# Patient Record
Sex: FEMALE | Race: WHITE | NOT HISPANIC OR LATINO
[De-identification: names, ages, dates, MRNs, and addresses within clinical notes are randomized per-mention and may not be internally consistent; named-entity substitution may affect disease eponyms.]

---

## 2019-03-25 PROBLEM — Z00.00 ENCOUNTER FOR PREVENTIVE HEALTH EXAMINATION: Status: ACTIVE | Noted: 2019-03-25

## 2019-04-03 PROBLEM — R94.39 ABNORMAL STRESS TEST: Status: RESOLVED | Noted: 2019-04-03 | Resolved: 2019-04-03

## 2019-04-03 PROBLEM — I25.2 OLD INFERIOR WALL MYOCARDIAL INFARCTION: Status: RESOLVED | Noted: 2019-04-03 | Resolved: 2019-04-03

## 2019-04-03 PROBLEM — Z87.891 FORMER SMOKER: Status: ACTIVE | Noted: 2019-04-03

## 2019-04-03 PROBLEM — I50.32 CHRONIC DIASTOLIC CONGESTIVE HEART FAILURE: Status: ACTIVE | Noted: 2019-04-03

## 2019-04-03 RX ORDER — NICOTINE 21 MG/24HR
14 PATCH, TRANSDERMAL 24 HOURS TRANSDERMAL DAILY
Qty: 28 | Refills: 0 | Status: ACTIVE | COMMUNITY

## 2019-04-03 RX ORDER — METOPROLOL TARTRATE 25 MG/1
25 TABLET, FILM COATED ORAL TWICE DAILY
Qty: 60 | Refills: 3 | Status: ACTIVE | COMMUNITY

## 2019-04-03 RX ORDER — ATORVASTATIN CALCIUM 80 MG/1
80 TABLET, FILM COATED ORAL
Qty: 30 | Refills: 0 | Status: ACTIVE | COMMUNITY

## 2019-04-03 RX ORDER — CLOPIDOGREL BISULFATE 75 MG/1
75 TABLET, FILM COATED ORAL
Qty: 30 | Refills: 3 | Status: ACTIVE | COMMUNITY

## 2019-04-04 ENCOUNTER — APPOINTMENT (OUTPATIENT)
Dept: CARDIOTHORACIC SURGERY | Facility: CLINIC | Age: 69
End: 2019-04-04
Payer: MEDICARE

## 2019-04-04 DIAGNOSIS — R94.39 ABNORMAL RESULT OF OTHER CARDIOVASCULAR FUNCTION STUDY: ICD-10-CM

## 2019-04-04 DIAGNOSIS — I50.32 CHRONIC DIASTOLIC (CONGESTIVE) HEART FAILURE: ICD-10-CM

## 2019-04-04 DIAGNOSIS — Z87.891 PERSONAL HISTORY OF NICOTINE DEPENDENCE: ICD-10-CM

## 2019-04-04 DIAGNOSIS — I25.2 OLD MYOCARDIAL INFARCTION: ICD-10-CM

## 2019-04-09 ENCOUNTER — APPOINTMENT (OUTPATIENT)
Dept: CARDIOTHORACIC SURGERY | Facility: CLINIC | Age: 69
End: 2019-04-09
Payer: MEDICARE

## 2019-04-09 VITALS — DIASTOLIC BLOOD PRESSURE: 67 MMHG | SYSTOLIC BLOOD PRESSURE: 121 MMHG

## 2019-04-09 VITALS
WEIGHT: 147 LBS | SYSTOLIC BLOOD PRESSURE: 110 MMHG | OXYGEN SATURATION: 98 % | DIASTOLIC BLOOD PRESSURE: 67 MMHG | RESPIRATION RATE: 12 BRPM | HEIGHT: 63 IN | TEMPERATURE: 98.4 F | HEART RATE: 70 BPM | BODY MASS INDEX: 26.05 KG/M2

## 2019-04-09 DIAGNOSIS — I25.10 ATHEROSCLEROTIC HEART DISEASE OF NATIVE CORONARY ARTERY W/OUT ANGINA PECTORIS: ICD-10-CM

## 2019-04-09 DIAGNOSIS — E78.5 HYPERLIPIDEMIA, UNSPECIFIED: ICD-10-CM

## 2019-04-09 PROCEDURE — 99204 OFFICE O/P NEW MOD 45 MIN: CPT

## 2019-04-09 NOTE — DATA REVIEWED
[FreeTextEntry1] : Cardiac Catherization 2/2019 which demonstrated dLM 60%, pLAD 80%, pCx 75%, RCA patent previous stent. Successful EDIS placed to pCx \par \par Echocardiogram in 7/2018 demonstrated\par Left ventricle is normal size wall thickness and systolic function \par left ventricle systolic function, EF 55%\par left ventricular diastolic function (stage 1 diastolic dysfunction)\par 1+ tricuspid regurgitation

## 2019-04-09 NOTE — CONSULT LETTER
[FreeTextEntry2] : Ron Lee M.D.\par [address] [place] [address] [address]\[place]  [id number] [FreeTextEntry3] : Jacob Dawkins MD\par  & \par \par Cardiovascular & Thoracic Surgery\par [facility] \par [address]\par [facility] [id number]\par

## 2019-04-09 NOTE — REVIEW OF SYSTEMS
[Chest Pain] : chest pain [Feeling Tired] : feeling tired [Negative] : Endocrine [Palpitations] : no palpitations [Dizziness] : no dizziness [Fainting] : no fainting

## 2019-04-09 NOTE — ASSESSMENT
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 She underwent a cardiac Catherization (2/6/2019 at [facility]) which demonstrated dLM 60%, pLAD 80%, pCx 75% (s/p EDIS in 2019), RCA patent previous stent. She was referred for surgical consultation. \par \par Imaging results and medical records were reviewed with patient and family member. Significant coronary artery disease, LAD disease. Risks, benefits and alternatives were discussed with patient. All questions were addressed. Since there has been a two month lapse from when the last pictures were taken. On review of the cardiac catherization there may be slight irregularities of recently stented Cx, I would recommend to repeat the cardiac Catherization. FFR to be performed. The MIDCAB procedure was discussed as well. There are more drawbacks of the procedure and the question is this the procedure of choice for this particular patient? Based on cardiac catherization there may be two vessels that will need to bypassed (LAD and possible Cx). \par \par Plan: \par 1) Cardiac Catherization (at [place])\par 2) Robotic MidCAB I would refer patient to my partner Dr. Garber

## 2019-04-09 NOTE — PHYSICAL EXAM
[General Appearance - Alert] : alert [General Appearance - In No Acute Distress] : in no acute distress [PERRL With Normal Accommodation] : pupils were equal in size, round, and reactive to light [Sclera] : the sclera and conjunctiva were normal [Extraocular Movements] : extraocular movements were intact [Outer Ear] : the ears and nose were normal in appearance [Neck Appearance] : the appearance of the neck was normal [Neck Cervical Mass (___cm)] : no neck mass was observed [Oropharynx] : the oropharynx was normal [Jugular Venous Distention Increased] : there was no jugular-venous distention [Thyroid Nodule] : there were no palpable thyroid nodules [Thyroid Diffuse Enlargement] : the thyroid was not enlarged [Respiration, Rhythm And Depth] : normal respiratory rhythm and effort [Apical Impulse] : the apical impulse was normal [Auscultation Breath Sounds / Voice Sounds] : lungs were clear to auscultation bilaterally [Heart Rate And Rhythm] : heart rate was normal and rhythm regular [Examination Of The Chest] : the chest was normal in appearance [Edema] : there was no peripheral edema [Breast Appearance] : normal in appearance [Veins - Varicosity Changes] : there were no varicosital changes [Bowel Sounds] : normal bowel sounds [Abdomen Soft] : soft [Cervical Lymph Nodes Enlarged Anterior Bilaterally] : anterior cervical [Cervical Lymph Nodes Enlarged Posterior Bilaterally] : posterior cervical [Skin Color & Pigmentation] : normal skin color and pigmentation [No CVA Tenderness] : no ~M costovertebral angle tenderness [Abnormal Walk] : normal gait [Oriented To Time, Place, And Person] : oriented to person, place, and time [No Focal Deficits] : no focal deficits [Impaired Insight] : insight and judgment were intact [FreeTextEntry1] : deferred

## 2019-04-09 NOTE — HISTORY OF PRESENT ILLNESS
[FreeTextEntry1] : Anna is a 68 year old female with PMH of HLD, former smoker (recently quit), inferior wall MI (s/p PCI to RCA in 2018) and triple vessel coronary artery disease. She underwent a cardiac Catherization (2/6/2019 at [facility]) which demonstrated dLM 60%, pLAD 80%, pCx 75% (s/p EDIS in 2019), RCA patent previous stent. She was referred for surgical consultation.  She denies chest pain, palpitations, dyspnea on exertion, orthopnea, PND, dizziness or syncope. She denies decline in her functional stamina, currently working a full time job in an assistive living.

## 2023-12-11 ENCOUNTER — APPOINTMENT (OUTPATIENT)
Dept: NEUROSURGERY | Facility: CLINIC | Age: 73
End: 2023-12-11
Payer: MEDICARE

## 2023-12-11 VITALS
SYSTOLIC BLOOD PRESSURE: 145 MMHG | BODY MASS INDEX: 24.8 KG/M2 | DIASTOLIC BLOOD PRESSURE: 85 MMHG | HEIGHT: 63 IN | WEIGHT: 140 LBS | HEART RATE: 71 BPM

## 2023-12-11 PROCEDURE — 99204 OFFICE O/P NEW MOD 45 MIN: CPT

## 2024-01-03 ENCOUNTER — APPOINTMENT (OUTPATIENT)
Dept: NEUROSURGERY | Facility: CLINIC | Age: 74
End: 2024-01-03
Payer: MEDICARE

## 2024-01-03 DIAGNOSIS — M43.10 SPONDYLOLISTHESIS, SITE UNSPECIFIED: ICD-10-CM

## 2024-01-03 DIAGNOSIS — M48.062 SPINAL STENOSIS, LUMBAR REGION WITH NEUROGENIC CLAUDICATION: ICD-10-CM

## 2024-01-03 PROCEDURE — 99441: CPT | Mod: 93

## 2024-02-22 ENCOUNTER — OFFICE (OUTPATIENT)
Dept: URBAN - METROPOLITAN AREA CLINIC 38 | Facility: CLINIC | Age: 74
Setting detail: OPHTHALMOLOGY
End: 2024-02-22
Payer: MEDICARE

## 2024-02-22 DIAGNOSIS — H02.831: ICD-10-CM

## 2024-02-22 DIAGNOSIS — H02.834: ICD-10-CM

## 2024-02-22 DIAGNOSIS — H25.13: ICD-10-CM

## 2024-02-22 PROCEDURE — 92004 COMPRE OPH EXAM NEW PT 1/>: CPT | Performed by: OPHTHALMOLOGY

## 2024-02-22 ASSESSMENT — REFRACTION_CURRENTRX
OS_SPHERE: +0.75
OS_OVR_VA: 20/
OD_OVR_VA: 20/
OS_CYLINDER: -2.50
OD_OVR_VA: 20/
OS_VPRISM_DIRECTION: SV
OS_SPHERE: +3.75
OD_CYLINDER: -3.00
OD_AXIS: 092
OD_VPRISM_DIRECTION: SV
OS_CYLINDER: -2.50
OS_VPRISM_DIRECTION: SV
OD_VPRISM_DIRECTION: SV
OD_SPHERE: +2.25
OD_CYLINDER: -3.25
OD_AXIS: 101
OS_AXIS: 085
OS_AXIS: 091
OD_SPHERE: -1.00
OS_OVR_VA: 20/

## 2024-02-22 ASSESSMENT — REFRACTION_AUTOREFRACTION
OD_SPHERE: -1.00
OS_AXIS: 077
OD_CYLINDER: -3.00
OD_AXIS: 086
OS_CYLINDER: -2.25
OS_SPHERE: +0.25

## 2024-02-22 ASSESSMENT — SPHEQUIV_DERIVED
OS_SPHEQUIV: -0.875
OD_SPHEQUIV: -2.5
OS_SPHEQUIV: -0.875
OD_SPHEQUIV: -3

## 2024-02-22 ASSESSMENT — LID POSITION - DERMATOCHALASIS
OD_DERMATOCHALASIS: RUL 2+
OS_DERMATOCHALASIS: LUL 2+

## 2024-02-22 ASSESSMENT — REFRACTION_MANIFEST
OS_ADD: +3.00
OD_VA1: 20/NI
OS_VA1: 20/NI
OD_SPHERE: -1.50
OD_AXIS: 090
OS_CYLINDER: -2.75
OD_ADD: +3.00
OS_AXIS: 085
OS_VA2: 20/30(J2)
OD_VA2: 20/30(J2)
OS_SPHERE: +0.50
OD_CYLINDER: -3.00

## 2024-02-22 ASSESSMENT — CONFRONTATIONAL VISUAL FIELD TEST (CVF)
OS_FINDINGS: FULL
OD_FINDINGS: FULL

## 2024-02-27 ENCOUNTER — APPOINTMENT (OUTPATIENT)
Dept: OPHTHALMOLOGY | Facility: CLINIC | Age: 74
End: 2024-02-27

## 2024-03-20 ENCOUNTER — OFFICE (OUTPATIENT)
Dept: URBAN - METROPOLITAN AREA CLINIC 38 | Facility: CLINIC | Age: 74
Setting detail: OPHTHALMOLOGY
End: 2024-03-20
Payer: MEDICARE

## 2024-03-20 DIAGNOSIS — H25.13: ICD-10-CM

## 2024-03-20 DIAGNOSIS — H25.11: ICD-10-CM

## 2024-03-20 PROCEDURE — 92136 OPHTHALMIC BIOMETRY: CPT | Mod: 26,RT | Performed by: OPHTHALMOLOGY

## 2024-03-20 PROCEDURE — 99213 OFFICE O/P EST LOW 20 MIN: CPT | Performed by: OPHTHALMOLOGY

## 2024-03-20 PROCEDURE — 92136 OPHTHALMIC BIOMETRY: CPT | Mod: TC | Performed by: OPHTHALMOLOGY

## 2024-03-20 ASSESSMENT — REFRACTION_CURRENTRX
OD_AXIS: 092
OD_VPRISM_DIRECTION: SV
OD_CYLINDER: -3.25
OD_VPRISM_DIRECTION: SV
OS_AXIS: 091
OD_SPHERE: +2.25
OS_SPHERE: +3.75
OS_OVR_VA: 20/
OS_AXIS: 085
OD_CYLINDER: -3.00
OS_VPRISM_DIRECTION: SV
OS_CYLINDER: -2.50
OS_VPRISM_DIRECTION: SV
OD_OVR_VA: 20/
OD_SPHERE: -1.00
OD_OVR_VA: 20/
OS_SPHERE: +0.75
OS_OVR_VA: 20/
OS_CYLINDER: -2.50
OD_AXIS: 101

## 2024-03-20 ASSESSMENT — REFRACTION_MANIFEST
OD_SPHERE: -1.50
OS_VA1: 20/NI
OS_AXIS: 085
OD_VA2: 20/30(J2)
OS_ADD: +3.00
OD_ADD: +3.00
OS_CYLINDER: -2.75
OS_SPHERE: +0.50
OD_VA1: 20/NI
OS_VA2: 20/30(J2)
OD_CYLINDER: -3.00
OD_AXIS: 090

## 2024-03-20 ASSESSMENT — LID POSITION - DERMATOCHALASIS
OD_DERMATOCHALASIS: RUL 2+
OS_DERMATOCHALASIS: LUL 2+

## 2024-03-20 ASSESSMENT — SPHEQUIV_DERIVED
OS_SPHEQUIV: -0.875
OD_SPHEQUIV: -3

## 2024-04-09 ENCOUNTER — AMBULATORY SURGERY CENTER (OUTPATIENT)
Dept: URBAN - METROPOLITAN AREA SURGERY 4 | Facility: SURGERY | Age: 74
Setting detail: OPHTHALMOLOGY
End: 2024-04-09
Payer: MEDICARE

## 2024-04-09 DIAGNOSIS — H25.11: ICD-10-CM

## 2024-04-09 PROCEDURE — 66984 XCAPSL CTRC RMVL W/O ECP: CPT | Mod: RT | Performed by: OPHTHALMOLOGY

## 2024-04-10 ENCOUNTER — RX ONLY (RX ONLY)
Age: 74
End: 2024-04-10

## 2024-04-10 ENCOUNTER — OFFICE (OUTPATIENT)
Dept: URBAN - METROPOLITAN AREA CLINIC 8 | Facility: CLINIC | Age: 74
Setting detail: OPHTHALMOLOGY
End: 2024-04-10
Payer: MEDICARE

## 2024-04-10 DIAGNOSIS — H25.12: ICD-10-CM

## 2024-04-10 PROCEDURE — 92136 OPHTHALMIC BIOMETRY: CPT | Mod: 26,LT | Performed by: OPHTHALMOLOGY

## 2024-04-10 ASSESSMENT — LID POSITION - DERMATOCHALASIS
OD_DERMATOCHALASIS: RUL 2+
OS_DERMATOCHALASIS: LUL 2+

## 2024-04-23 ENCOUNTER — AMBULATORY SURGERY CENTER (OUTPATIENT)
Dept: URBAN - METROPOLITAN AREA SURGERY 4 | Facility: SURGERY | Age: 74
Setting detail: OPHTHALMOLOGY
End: 2024-04-23
Payer: MEDICARE

## 2024-04-23 DIAGNOSIS — H25.12: ICD-10-CM

## 2024-04-23 PROCEDURE — 66984 XCAPSL CTRC RMVL W/O ECP: CPT | Mod: 79,LT | Performed by: OPHTHALMOLOGY

## 2024-04-24 ENCOUNTER — OFFICE (OUTPATIENT)
Dept: URBAN - METROPOLITAN AREA CLINIC 38 | Facility: CLINIC | Age: 74
Setting detail: OPHTHALMOLOGY
End: 2024-04-24
Payer: MEDICARE

## 2024-04-24 DIAGNOSIS — Z96.1: ICD-10-CM

## 2024-04-24 PROCEDURE — 99024 POSTOP FOLLOW-UP VISIT: CPT

## 2024-04-24 ASSESSMENT — LID POSITION - DERMATOCHALASIS
OD_DERMATOCHALASIS: RUL 2+
OS_DERMATOCHALASIS: LUL 2+

## 2024-05-20 ENCOUNTER — OFFICE (OUTPATIENT)
Dept: URBAN - METROPOLITAN AREA CLINIC 38 | Facility: CLINIC | Age: 74
Setting detail: OPHTHALMOLOGY
End: 2024-05-20
Payer: MEDICARE

## 2024-05-20 DIAGNOSIS — Z96.1: ICD-10-CM

## 2024-05-20 DIAGNOSIS — H52.4: ICD-10-CM

## 2024-05-20 PROCEDURE — 99024 POSTOP FOLLOW-UP VISIT: CPT

## 2024-05-20 PROCEDURE — 92015 DETERMINE REFRACTIVE STATE: CPT

## 2024-05-20 ASSESSMENT — LID POSITION - DERMATOCHALASIS
OD_DERMATOCHALASIS: RUL 2+
OS_DERMATOCHALASIS: LUL 2+

## 2024-05-20 ASSESSMENT — CONFRONTATIONAL VISUAL FIELD TEST (CVF)
OD_FINDINGS: FULL
OS_FINDINGS: FULL